# Patient Record
Sex: MALE | Race: WHITE | URBAN - METROPOLITAN AREA
[De-identification: names, ages, dates, MRNs, and addresses within clinical notes are randomized per-mention and may not be internally consistent; named-entity substitution may affect disease eponyms.]

---

## 2019-04-18 ENCOUNTER — TELEPHONE (OUTPATIENT)
Dept: FAMILY MEDICINE CLINIC | Age: 19
End: 2019-04-18

## 2019-04-18 NOTE — TELEPHONE ENCOUNTER
----- Message from Sharon Solano sent at 4/17/2019  7:18 PM EDT -----  Regarding: Dr Juvenal Wesley, mom, is requesting an appt for pt.     Best contact # [phone]

## 2019-04-18 NOTE — TELEPHONE ENCOUNTER
Called and spoke with pt's mother. Asked mother the nature of patients appointment and if patient is being referred by another provider. Per mother, she states her son is not telling her much, but that he wants to be seen and treated to prevent STD's and HIV. Asked pt's mother, if patient had a PCP,and she states no. Advised pt's mother to have patient establish care with a PCP and that provider should be able to assist patient with his concerns. Pt's mother states understanding of this and will advise patient.